# Patient Record
Sex: FEMALE | Race: WHITE
[De-identification: names, ages, dates, MRNs, and addresses within clinical notes are randomized per-mention and may not be internally consistent; named-entity substitution may affect disease eponyms.]

---

## 2017-04-03 ENCOUNTER — HOSPITAL ENCOUNTER (OUTPATIENT)
Dept: HOSPITAL 18 - NAVSJIPCSP | Age: 72
End: 2017-04-03
Attending: FAMILY MEDICINE
Payer: COMMERCIAL

## 2017-04-03 DIAGNOSIS — R56.9: Primary | ICD-10-CM

## 2017-04-03 LAB
ALBUMIN SERPL BCG-MCNC: 4 G/DL (ref 3.4–4.8)
ALP SERPL-CCNC: 79 U/L (ref 40–150)
ALT SERPL W P-5'-P-CCNC: 15 U/L (ref 0–55)
ANION GAP SERPL CALC-SCNC: 13 MMOL/L (ref 10–20)
AST SERPL-CCNC: 15 U/L (ref 5–34)
BILIRUB DIRECT SERPL-MCNC: 0.2 MG/DL (ref 0.1–0.3)
BILIRUB SERPL-MCNC: 0.4 MG/DL (ref 0.2–1.2)
BUN SERPL-MCNC: 12 MG/DL (ref 9.8–20.1)
CALCIUM SERPL-MCNC: 9.2 MG/DL (ref 7.8–10.44)
CHD RISK SERPL-RTO: 2.3 (ref ?–4.5)
CHLORIDE SERPL-SCNC: 108 MMOL/L (ref 98–107)
CHOLEST SERPL-MCNC: 186 MG/DL
CO2 SERPL-SCNC: 25 MMOL/L (ref 23–31)
CREAT CL PREDICTED SERPL C-G-VRATE: 0 ML/MIN (ref 70–130)
GLUCOSE SERPL-MCNC: 85 MG/DL (ref 80–115)
HDLC SERPL-MCNC: 80 MG/DL
HGB BLD-MCNC: 13.5 G/DL (ref 12–16)
LDLC SERPL CALC-MCNC: 96 MG/DL
MCH RBC QN AUTO: 28.7 PG (ref 27–31)
MCV RBC AUTO: 90.7 FL (ref 81–99)
MDIFF COMPLETE?: YES
PLATELET # BLD AUTO: 282 THOU/UL (ref 130–400)
PLATELET BLD QL SMEAR: (no result)
POTASSIUM SERPL-SCNC: 4.3 MMOL/L (ref 3.5–5.1)
RBC # BLD AUTO: 4.7 MILL/UL (ref 4.2–5.4)
SODIUM SERPL-SCNC: 142 MMOL/L (ref 136–145)
TRIGL SERPL-MCNC: 52 MG/DL (ref ?–150)
WBC # BLD AUTO: 5.5 THOU/UL (ref 4.8–10.8)

## 2017-04-03 PROCEDURE — 80061 LIPID PANEL: CPT

## 2017-04-03 PROCEDURE — 83036 HEMOGLOBIN GLYCOSYLATED A1C: CPT

## 2017-04-03 PROCEDURE — 80076 HEPATIC FUNCTION PANEL: CPT

## 2017-04-03 PROCEDURE — 84443 ASSAY THYROID STIM HORMONE: CPT

## 2017-04-03 PROCEDURE — 36415 COLL VENOUS BLD VENIPUNCTURE: CPT

## 2017-04-03 PROCEDURE — 80048 BASIC METABOLIC PNL TOTAL CA: CPT

## 2017-04-03 PROCEDURE — 85025 COMPLETE CBC W/AUTO DIFF WBC: CPT

## 2017-04-03 PROCEDURE — 80185 ASSAY OF PHENYTOIN TOTAL: CPT

## 2017-08-07 ENCOUNTER — HOSPITAL ENCOUNTER (OUTPATIENT)
Dept: HOSPITAL 18 - NAVSJIPCSP | Age: 72
End: 2017-08-07
Attending: FAMILY MEDICINE
Payer: COMMERCIAL

## 2017-08-07 DIAGNOSIS — N39.41: ICD-10-CM

## 2017-08-07 DIAGNOSIS — R56.9: ICD-10-CM

## 2017-08-07 DIAGNOSIS — Z79.899: ICD-10-CM

## 2017-08-07 DIAGNOSIS — H90.5: ICD-10-CM

## 2017-08-07 DIAGNOSIS — S76.312A: Primary | ICD-10-CM

## 2017-08-07 DIAGNOSIS — M19.90: ICD-10-CM

## 2017-08-07 LAB
ALBUMIN SERPL BCG-MCNC: 4.1 G/DL (ref 3.4–4.8)
ALP SERPL-CCNC: 78 U/L (ref 40–150)
ALT SERPL W P-5'-P-CCNC: 13 U/L (ref 8–55)
ANION GAP SERPL CALC-SCNC: 16 MMOL/L (ref 10–20)
AST SERPL-CCNC: 16 U/L (ref 5–34)
BILIRUB DIRECT SERPL-MCNC: 0.2 MG/DL (ref 0.1–0.3)
BILIRUB SERPL-MCNC: 0.4 MG/DL (ref 0.2–1.2)
BUN SERPL-MCNC: 10 MG/DL (ref 9.8–20.1)
CALCIUM SERPL-MCNC: 8.8 MG/DL (ref 7.8–10.44)
CHD RISK SERPL-RTO: 2.3 (ref ?–4.5)
CHLORIDE SERPL-SCNC: 110 MMOL/L (ref 98–107)
CHOLEST SERPL-MCNC: 173 MG/DL
CO2 SERPL-SCNC: 20 MMOL/L (ref 23–31)
CREAT CL PREDICTED SERPL C-G-VRATE: 0 ML/MIN (ref 70–130)
GLUCOSE SERPL-MCNC: 84 MG/DL (ref 83–110)
HDLC SERPL-MCNC: 75 MG/DL
HGB BLD-MCNC: 13.8 G/DL (ref 12–16)
LDLC SERPL CALC-MCNC: 87 MG/DL
MCH RBC QN AUTO: 29.2 PG (ref 27–31)
MCV RBC AUTO: 91.3 FL (ref 81–99)
MDIFF COMPLETE?: YES
PLATELET # BLD AUTO: 266 THOU/UL (ref 130–400)
POTASSIUM SERPL-SCNC: 4.4 MMOL/L (ref 3.5–5.1)
RBC # BLD AUTO: 4.71 MILL/UL (ref 4.2–5.4)
SODIUM SERPL-SCNC: 142 MMOL/L (ref 136–145)
TRIGL SERPL-MCNC: 56 MG/DL (ref ?–150)
WBC # BLD AUTO: 5.4 THOU/UL (ref 4.8–10.8)

## 2017-08-07 PROCEDURE — 80048 BASIC METABOLIC PNL TOTAL CA: CPT

## 2017-08-07 PROCEDURE — 80185 ASSAY OF PHENYTOIN TOTAL: CPT

## 2017-08-07 PROCEDURE — 36415 COLL VENOUS BLD VENIPUNCTURE: CPT

## 2017-08-07 PROCEDURE — 84443 ASSAY THYROID STIM HORMONE: CPT

## 2017-08-07 PROCEDURE — 80076 HEPATIC FUNCTION PANEL: CPT

## 2017-08-07 PROCEDURE — 83036 HEMOGLOBIN GLYCOSYLATED A1C: CPT

## 2017-08-07 PROCEDURE — 85025 COMPLETE CBC W/AUTO DIFF WBC: CPT

## 2017-08-07 PROCEDURE — 80061 LIPID PANEL: CPT

## 2019-04-01 ENCOUNTER — HOSPITAL ENCOUNTER (EMERGENCY)
Dept: HOSPITAL 92 - SCSER | Age: 74
Discharge: HOME | End: 2019-04-01
Payer: MEDICARE

## 2019-04-01 DIAGNOSIS — Z79.899: ICD-10-CM

## 2019-04-01 DIAGNOSIS — M25.552: Primary | ICD-10-CM

## 2019-04-01 PROCEDURE — 72170 X-RAY EXAM OF PELVIS: CPT

## 2019-04-01 NOTE — RAD
ONE VIEW PELVIS:

 

History: Fall three weeks ago, pain. 

 

FINDINGS: 

Sacroiliac joints are patent and symmetric. Sacral ala are preserved. Intact bony pelvis. Contour of 
both femoral heads is maintained. Symmetric hip bone spaces. 

 

IMPRESSION: 

No fracture. 

 

POS: PPP

## 2019-04-01 NOTE — RAD
LEFT HIP TWO VIEWS:

 

History: Fall. Pain. 

 

Comparison: None. 

 

FINDINGS: 

Joint space is preserved. Contour of the femoral head is maintained. No fracture. 

 

IMPRESSION: 

No fracture. 

 

POS: PPP

## 2021-05-14 ENCOUNTER — HOSPITAL ENCOUNTER (INPATIENT)
Dept: HOSPITAL 18 - NAV ACUTE | Age: 76
LOS: 7 days | Discharge: HOME HEALTH | DRG: 560 | End: 2021-05-21
Attending: STUDENT IN AN ORGANIZED HEALTH CARE EDUCATION/TRAINING PROGRAM | Admitting: STUDENT IN AN ORGANIZED HEALTH CARE EDUCATION/TRAINING PROGRAM
Payer: COMMERCIAL

## 2021-05-14 VITALS — BODY MASS INDEX: 20.9 KG/M2

## 2021-05-14 DIAGNOSIS — Z96.642: ICD-10-CM

## 2021-05-14 DIAGNOSIS — M21.371: ICD-10-CM

## 2021-05-14 DIAGNOSIS — N39.0: ICD-10-CM

## 2021-05-14 DIAGNOSIS — Z90.710: ICD-10-CM

## 2021-05-14 DIAGNOSIS — G62.9: ICD-10-CM

## 2021-05-14 DIAGNOSIS — Z47.1: Primary | ICD-10-CM

## 2021-05-14 DIAGNOSIS — M81.0: ICD-10-CM

## 2021-05-14 DIAGNOSIS — Z90.49: ICD-10-CM

## 2021-05-14 PROCEDURE — 85025 COMPLETE CBC W/AUTO DIFF WBC: CPT

## 2021-05-14 PROCEDURE — 80048 BASIC METABOLIC PNL TOTAL CA: CPT

## 2021-05-14 RX ADMIN — Medication SCH MG: at 20:02

## 2021-05-14 RX ADMIN — HYDROCODONE BITARTRATE AND ACETAMINOPHEN PRN TAB: 5; 325 TABLET ORAL at 20:00

## 2021-05-15 LAB
ANION GAP SERPL CALC-SCNC: 11 MMOL/L (ref 10–20)
BASOPHILS # BLD AUTO: 0.1 THOU/UL (ref 0–0.2)
BASOPHILS NFR BLD AUTO: 1.3 % (ref 0–1)
BUN SERPL-MCNC: 10 MG/DL (ref 9.8–20.1)
CALCIUM SERPL-MCNC: 8.2 MG/DL (ref 7.8–10.44)
CHLORIDE SERPL-SCNC: 107 MMOL/L (ref 98–107)
CO2 SERPL-SCNC: 26 MMOL/L (ref 23–31)
CREAT CL PREDICTED SERPL C-G-VRATE: 67 ML/MIN (ref 70–130)
EOSINOPHIL # BLD AUTO: 0.4 THOU/UL (ref 0–0.7)
EOSINOPHIL NFR BLD AUTO: 6.2 % (ref 0–10)
GLUCOSE SERPL-MCNC: 93 MG/DL (ref 83–110)
HGB BLD-MCNC: 10.1 G/DL (ref 12–16)
LYMPHOCYTES # BLD AUTO: 2.7 THOU/UL (ref 1.2–3.4)
LYMPHOCYTES NFR BLD AUTO: 39.9 % (ref 21–51)
MCH RBC QN AUTO: 27.4 PG (ref 27–31)
MCV RBC AUTO: 91.4 FL (ref 78–98)
MDIFF COMPLETE?: YES
MONOCYTES # BLD AUTO: 0.8 THOU/UL (ref 0.11–0.59)
MONOCYTES NFR BLD AUTO: 11.2 % (ref 0–10)
NEUTROPHILS # BLD AUTO: 2.8 THOU/UL (ref 1.4–6.5)
NEUTROPHILS NFR BLD AUTO: 41.5 % (ref 42–75)
PLATELET # BLD AUTO: 321 THOU/UL (ref 130–400)
POTASSIUM SERPL-SCNC: 4.3 MMOL/L (ref 3.5–5.1)
RBC # BLD AUTO: 3.7 MILL/UL (ref 4.2–5.4)
SODIUM SERPL-SCNC: 140 MMOL/L (ref 136–145)
WBC # BLD AUTO: 6.8 THOU/UL (ref 4.8–10.8)

## 2021-05-15 RX ADMIN — Medication SCH MG: at 09:10

## 2021-05-15 RX ADMIN — ESTERIFIED ESTROGENS SCH: 0.62 TABLET, FILM COATED ORAL at 09:17

## 2021-05-15 RX ADMIN — Medication SCH MG: at 17:47

## 2021-05-16 RX ADMIN — ESTERIFIED ESTROGENS SCH: 0.62 TABLET, FILM COATED ORAL at 08:24

## 2021-05-16 RX ADMIN — Medication SCH MG: at 17:48

## 2021-05-16 RX ADMIN — ESTERIFIED ESTROGENS SCH MG: 0.62 TABLET, FILM COATED ORAL at 21:15

## 2021-05-16 RX ADMIN — ESTERIFIED ESTROGENS SCH MG: 0.62 TABLET, FILM COATED ORAL at 08:21

## 2021-05-16 RX ADMIN — ESTERIFIED ESTROGENS SCH: 0.62 TABLET, FILM COATED ORAL at 21:31

## 2021-05-16 RX ADMIN — Medication SCH MG: at 08:19

## 2021-05-17 RX ADMIN — Medication SCH MG: at 17:14

## 2021-05-17 RX ADMIN — Medication SCH MG: at 08:37

## 2021-05-17 RX ADMIN — HYDROCODONE BITARTRATE AND ACETAMINOPHEN PRN TAB: 5; 325 TABLET ORAL at 21:09

## 2021-05-18 RX ADMIN — Medication SCH MG: at 17:58

## 2021-05-18 RX ADMIN — Medication SCH MG: at 08:39

## 2021-05-19 RX ADMIN — Medication SCH MG: at 17:42

## 2021-05-19 RX ADMIN — Medication SCH MG: at 07:50

## 2021-05-20 RX ADMIN — Medication SCH MG: at 08:03

## 2021-05-20 RX ADMIN — Medication SCH MG: at 18:02

## 2021-05-21 VITALS — SYSTOLIC BLOOD PRESSURE: 124 MMHG | TEMPERATURE: 98.1 F | DIASTOLIC BLOOD PRESSURE: 62 MMHG

## 2021-05-21 RX ADMIN — Medication SCH MG: at 08:45
